# Patient Record
Sex: FEMALE | Race: BLACK OR AFRICAN AMERICAN | NOT HISPANIC OR LATINO | URBAN - METROPOLITAN AREA
[De-identification: names, ages, dates, MRNs, and addresses within clinical notes are randomized per-mention and may not be internally consistent; named-entity substitution may affect disease eponyms.]

---

## 2022-02-11 ENCOUNTER — EMERGENCY (EMERGENCY)
Facility: HOSPITAL | Age: 37
LOS: 1 days | Discharge: HOME | End: 2022-02-11
Attending: EMERGENCY MEDICINE | Admitting: EMERGENCY MEDICINE
Payer: SELF-PAY

## 2022-02-11 VITALS
DIASTOLIC BLOOD PRESSURE: 114 MMHG | SYSTOLIC BLOOD PRESSURE: 163 MMHG | RESPIRATION RATE: 18 BRPM | TEMPERATURE: 98 F | OXYGEN SATURATION: 98 % | HEART RATE: 103 BPM

## 2022-02-11 DIAGNOSIS — M54.2 CERVICALGIA: ICD-10-CM

## 2022-02-11 DIAGNOSIS — R51.9 HEADACHE, UNSPECIFIED: ICD-10-CM
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
## 2022-02-11 DIAGNOSIS — Z23 ENCOUNTER FOR IMMUNIZATION: ICD-10-CM

## 2022-02-11 DIAGNOSIS — S09.90XA UNSPECIFIED INJURY OF HEAD, INITIAL ENCOUNTER: ICD-10-CM

## 2022-02-11 PROCEDURE — 99053 MED SERV 10PM-8AM 24 HR FAC: CPT

## 2022-02-11 PROCEDURE — 99285 EMERGENCY DEPT VISIT HI MDM: CPT

## 2022-02-11 PROCEDURE — 72125 CT NECK SPINE W/O DYE: CPT | Mod: 26

## 2022-02-11 PROCEDURE — 71250 CT THORAX DX C-: CPT | Mod: 26

## 2022-02-11 PROCEDURE — 70450 CT HEAD/BRAIN W/O DYE: CPT | Mod: 26

## 2022-02-11 RX ORDER — TETANUS TOXOID, REDUCED DIPHTHERIA TOXOID AND ACELLULAR PERTUSSIS VACCINE, ADSORBED 5; 2.5; 8; 8; 2.5 [IU]/.5ML; [IU]/.5ML; UG/.5ML; UG/.5ML; UG/.5ML
0.5 SUSPENSION INTRAMUSCULAR ONCE
Refills: 0 | Status: COMPLETED | OUTPATIENT
Start: 2022-02-11 | End: 2022-02-11

## 2022-02-11 RX ORDER — IBUPROFEN 200 MG
600 TABLET ORAL ONCE
Refills: 0 | Status: COMPLETED | OUTPATIENT
Start: 2022-02-11 | End: 2022-02-11

## 2022-02-11 RX ORDER — ACETAMINOPHEN 500 MG
650 TABLET ORAL ONCE
Refills: 0 | Status: COMPLETED | OUTPATIENT
Start: 2022-02-11 | End: 2022-02-11

## 2022-02-11 RX ADMIN — Medication 650 MILLIGRAM(S): at 04:15

## 2022-02-11 RX ADMIN — Medication 600 MILLIGRAM(S): at 04:16

## 2022-02-11 NOTE — ED PROVIDER NOTE - PHYSICAL EXAMINATION
Constitutional:  anxious appearing, tearful, awake, alert, oriented to person, place, time/situation   HEENT: lower lip swelling, 2 small superficial abrasions to inner lower lip.  No gaping laceration.  No facial bony tenderness.  Airway patent, Nasal mucosa clear. Mouth with normal mucosa. Throat has no vesicles, no oropharyngeal exudates and uvula is midline.  Eyes: Clear bilaterally, pupils equal, round and reactive to light.  Cardiac: Normal rate, regular rhythm.  Respiratory: Breath sounds clear and equal bilaterally.  GI: Abdomen soft, non-tender, no guarding.  MSK: midline spinal TTP in C-spine and T-spine.  paraspinal muscular TTP throughout T spine.    Neuro: Alert and oriented, no focal deficits, no motor or sensory deficits.  Skin: Skin normal color for race, warm, dry and intact. No evidence of rash.

## 2022-02-11 NOTE — ED PROVIDER NOTE - PATIENT PORTAL LINK FT
You can access the FollowMyHealth Patient Portal offered by [facility] by registering at the following website: [email or web address]. By joining [employer] [facility]’s FollowMyHealth portal, you will also be able to view your health information using other applications (apps) compatible with our system.
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
## 2022-02-11 NOTE — ED PROVIDER NOTE - NS ED ROS FT
Constitutional:  No fever, No chills, No night sweats  Eyes:  No visual changes, No discharge, No redness  ENMT:  No epistaxis, no nasal congestion, no throat pain, no difficulty swallowing  CV:  No chest pain, No palpitations, No peripheral edema  Resp:  No cough, No shortness of breath  GI:  No abdominal pain, No vomiting, No diarrhea  MSK:  +neck pain or stiffness, No joint swelling or pain, +back pain  Neuro: no loss of consciousness, no gait abnormality, +headache, no sensory deficits, and no weakness.  Skin:  No abrasions, +lesions, no rashes  Psych:  No known mental health issues

## 2022-02-11 NOTE — ED PROVIDER NOTE - NSFOLLOWUPINSTRUCTIONS_ED_ALL_ED_FT
General Assault      Assault includes any behavior or physical attack—whether it is on purpose or not—that results in injury to another person, damage to property, or both. This also includes assault that has not yet happened, but is planned to happen, as well as threats that cause fear of assault. Threats of assault may be physical, verbal, or written. They may be spoken or sent by:  •Mail.      •E-mail.      •Text.      •Social media.      •Fax.      The threats may be direct, implied, or understood.      What are the different forms of assault?    Forms of assault include:•Physically assaulting a person. This includes physical threats to inflict physical harm as well as:  •Slapping.      •Hitting.      •Poking.      •Kicking.      •Punching.      •Pushing.        •Sexually assaulting a person. Sexual assault is any sexual activity that a person is forced, threatened, or coerced to participate in. It may or may not involve physical contact with the person who is assaulting you. You are sexually assaulted if you are forced to have sexual contact of any kind.      •Damaging or destroying a person's assistive equipment, such as glasses, canes, or walkers.      •Throwing or hitting objects.      •Using or displaying a weapon to harm or threaten someone. Examples of weapons may include guns, knives, sticks, or bats.      •Using or displaying an object that appears to be a weapon in a threatening manner.      •Using greater physical size or strength to intimidate someone.      •Making intimidating or threatening gestures.      •Bullying.      •Hazing.      •Using language that is intimidating, threatening, hostile, or abusive.      •Stalking.      •Restraining someone with force.        What can I do if I experience assault?     •Report assaults, threats, and stalking to the police. Call 911 if you are in immediate danger or you need medical help.    •Work with a  or an advocate to get legal protection against someone who has assaulted you or threatened you with assault. Protection includes:  •Getting a court order asking the person to stay away from you (restraining order).      •Moving you to a private address.      •Prosecuting the person through the courts. Laws vary depending on where you live.          Follow these instructions at home:    •Avoid areas where you feel unsafe.      •Try to stay in areas that are around other people.      •Consider learning methods of protection from assault, such as self-defense.        Where to find support     If you have experienced assault, you may seek help from:  •A professional counselor, family member, clergy, or a trusted friend to talk about what happened.      •The National Center for Victims of Crime: www.victimsofcrime.org. This is an advocacy center that provides information for people who have been assaulted or subjected to violence.        Summary    •An assault is any behavior or physical attack that results in injury to another person, damage to property, or both.      •An assault includes threats that cause a person to fear for his or her safety. Threats may be communicated via spoken word, mail, e-mail, text messages, or social media.      •There are many forms of assault. They include physical assault, sexual assault, damaging a person's property, displaying a weapon, stalking another person, or restraining someone with force.      •Report assaults, threats, and stalking to the police. Call 911 if you are in immediate danger or you need medical help.      •Prevent assault by being aware of your surroundings, avoiding areas where you feel unsafe, and talking to a  about getting legal protection against someone who has assaulted you or threatened you with assault.      This information is not intended to replace advice given to you by your health care provider. Make sure you discuss any questions you have with your health care provider.
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
## 2022-02-11 NOTE — ED ADULT NURSE NOTE - CHPI ED NUR SYMPTOMS NEG
no abrasion/no change in level of consciousness/no chest pain/no chest wall tenderness/no loss of consciousness/no seizure/no vomiting/no weakness
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
## 2022-02-11 NOTE — ED ADULT NURSE NOTE - NSIMPLEMENTINTERV_GEN_ALL_ED
Implemented All Universal Safety Interventions:  [place] to call system. Call bell, personal items and telephone within reach. Instruct patient to call for assistance. Room bathroom lighting operational. Non-slip footwear when patient is off stretcher. Physically safe environment: no spills, clutter or unnecessary equipment. Stretcher in lowest position, wheels locked, appropriate side rails in place.